# Patient Record
Sex: MALE | Race: OTHER | HISPANIC OR LATINO
[De-identification: names, ages, dates, MRNs, and addresses within clinical notes are randomized per-mention and may not be internally consistent; named-entity substitution may affect disease eponyms.]

---

## 2020-08-21 PROBLEM — Z00.00 ENCOUNTER FOR PREVENTIVE HEALTH EXAMINATION: Status: ACTIVE | Noted: 2020-08-21

## 2020-08-24 ENCOUNTER — APPOINTMENT (OUTPATIENT)
Dept: OTOLARYNGOLOGY | Facility: CLINIC | Age: 25
End: 2020-08-24

## 2020-09-09 ENCOUNTER — APPOINTMENT (OUTPATIENT)
Dept: OTOLARYNGOLOGY | Facility: CLINIC | Age: 25
End: 2020-09-09
Payer: COMMERCIAL

## 2020-09-09 VITALS — TEMPERATURE: 98 F | BODY MASS INDEX: 21.82 KG/M2 | WEIGHT: 170 LBS | HEIGHT: 74 IN

## 2020-09-09 DIAGNOSIS — G47.8 OTHER SLEEP DISORDERS: ICD-10-CM

## 2020-09-09 DIAGNOSIS — J34.2 DEVIATED NASAL SEPTUM: ICD-10-CM

## 2020-09-09 DIAGNOSIS — J31.0 CHRONIC RHINITIS: ICD-10-CM

## 2020-09-09 DIAGNOSIS — R06.83 SNORING: ICD-10-CM

## 2020-09-09 DIAGNOSIS — Z82.49 FAMILY HISTORY OF ISCHEMIC HEART DISEASE AND OTHER DISEASES OF THE CIRCULATORY SYSTEM: ICD-10-CM

## 2020-09-09 DIAGNOSIS — K21.9 GASTRO-ESOPHAGEAL REFLUX DISEASE W/OUT ESOPHAGITIS: ICD-10-CM

## 2020-09-09 PROCEDURE — 31231 NASAL ENDOSCOPY DX: CPT

## 2020-09-09 PROCEDURE — 99203 OFFICE O/P NEW LOW 30 MIN: CPT | Mod: 25

## 2020-09-09 RX ORDER — FLUTICASONE PROPIONATE 50 UG/1
50 SPRAY, METERED NASAL DAILY
Qty: 1 | Refills: 3 | Status: ACTIVE | COMMUNITY
Start: 2020-09-09 | End: 1900-01-01

## 2020-09-09 NOTE — HISTORY OF PRESENT ILLNESS
[de-identified] : WALDO SINGH is a 25 year patient With a several year history of snoring. He said it is getting worse. He has occasional nighttime awakenings and daytime fatigue. He has had some weight gain over the past 5 years. He has not had a sleep study. He also has a history of chronic nasal congestion and obstruction. The right side is worse. It is constant but worse with the change in seasons. He does not think that he gets recurrent sinus infections. He has not had to take antibiotics. He denies a history of nasal trauma or nasal surgery. He is not using sinus medications. He did try Breathe Right strips which help a little bit. He had upper respiratory tract infection recently. He has no sinus pain or pressure. He has not had allergy testing or CT scan of the sinuses

## 2020-09-09 NOTE — ASSESSMENT
[FreeTextEntry1] : He has a long history of snoring. It has been getting worse. He has daytime fatigue and frequent nighttime awakenings.\par \par He has a history of chronic rhinitis. He has bilateral nasal congestion and obstruction which is worse on the left side. Nasal endoscopy showed a deviated nasal septum to the right and inferior turbinate hypertrophy. He may also have mild reflux.  There was a little mucous in the right middle meatus. A culture was taken. He did have a cold recently. We will rule out bacterial infection\par \par PLAN\par \par -findings and management options discussed in detail with the patient. \par -Nasal saline rinses and antihistamine/decongestant as needed\par -trial of flonase\par -Breathe Right strips as needed\par -they were asked to call for the results of the culture in the next few days. if it is positive, he will be placed on antibiotics\par -Allergy evaluation\par -consider CT scan of the sinuses. He may consider nasal procedures to improve his breathing depending on how he does\par -Reflux precautions and medication (OTC pepcid) as needed\par -he was asked to avoid sleeping on his back, eating before bed and drinking alcohol at night\par - Weight loss\par - Elevation of the head of bed at night\par - Sleep study to rule out obstructive sleep apnea\par - Some of the risks of untreated sleep apnea reviewed (HTN, MI and stroke)\par - Options for treatment will be discussed after the sleep study\par - Follow up after the sleep study and allergy evaluation\par - call and return earlier if any concerns.

## 2020-09-09 NOTE — CONSULT LETTER
[Consult Letter:] : I had the pleasure of evaluating your patient, [unfilled]. [Dear  ___] : Dear  [unfilled], [Please see my note below.] : Please see my note below. [Sincerely,] : Sincerely, [Consult Closing:] : Thank you very much for allowing me to participate in the care of this patient.  If you have any questions, please do not hesitate to contact me. [FreeTextEntry3] : Lauryn Gutiérrez MD\par

## 2020-09-09 NOTE — REASON FOR VISIT
[Initial Evaluation] : an initial evaluation for [Nasal Obstruction] : nasal obstruction [Sleep Apnea/ Snoring] : sleep apnea/ snoring

## 2020-09-15 LAB — BACTERIA FLD CULT: ABNORMAL

## 2020-11-01 DIAGNOSIS — Z01.818 ENCOUNTER FOR OTHER PREPROCEDURAL EXAMINATION: ICD-10-CM

## 2020-11-02 ENCOUNTER — APPOINTMENT (OUTPATIENT)
Dept: DISASTER EMERGENCY | Facility: CLINIC | Age: 25
End: 2020-11-02

## 2020-11-03 LAB — SARS-COV-2 N GENE NPH QL NAA+PROBE: NOT DETECTED

## 2020-11-04 ENCOUNTER — APPOINTMENT (OUTPATIENT)
Dept: SLEEP CENTER | Facility: HOSPITAL | Age: 25
End: 2020-11-04

## 2020-11-04 ENCOUNTER — OUTPATIENT (OUTPATIENT)
Dept: OUTPATIENT SERVICES | Facility: HOSPITAL | Age: 25
LOS: 1 days | End: 2020-11-04
Payer: COMMERCIAL

## 2020-11-04 DIAGNOSIS — G47.33 OBSTRUCTIVE SLEEP APNEA (ADULT) (PEDIATRIC): ICD-10-CM

## 2020-11-04 PROCEDURE — 95810 POLYSOM 6/> YRS 4/> PARAM: CPT | Mod: 26

## 2020-11-04 PROCEDURE — 95810 POLYSOM 6/> YRS 4/> PARAM: CPT
